# Patient Record
Sex: MALE | Race: WHITE | HISPANIC OR LATINO
[De-identification: names, ages, dates, MRNs, and addresses within clinical notes are randomized per-mention and may not be internally consistent; named-entity substitution may affect disease eponyms.]

---

## 2017-03-21 ENCOUNTER — MEDICATION RENEWAL (OUTPATIENT)
Age: 62
End: 2017-03-21

## 2017-04-24 ENCOUNTER — APPOINTMENT (OUTPATIENT)
Dept: CARDIOLOGY | Facility: CLINIC | Age: 62
End: 2017-04-24

## 2017-04-26 ENCOUNTER — NON-APPOINTMENT (OUTPATIENT)
Age: 62
End: 2017-04-26

## 2017-04-26 ENCOUNTER — APPOINTMENT (OUTPATIENT)
Dept: CARDIOLOGY | Facility: CLINIC | Age: 62
End: 2017-04-26

## 2017-04-26 VITALS
DIASTOLIC BLOOD PRESSURE: 79 MMHG | OXYGEN SATURATION: 96 % | SYSTOLIC BLOOD PRESSURE: 131 MMHG | HEIGHT: 65 IN | HEART RATE: 75 BPM | WEIGHT: 180 LBS | BODY MASS INDEX: 29.99 KG/M2

## 2017-05-16 ENCOUNTER — APPOINTMENT (OUTPATIENT)
Dept: CARDIOLOGY | Facility: CLINIC | Age: 62
End: 2017-05-16

## 2017-05-17 ENCOUNTER — APPOINTMENT (OUTPATIENT)
Dept: CARDIOLOGY | Facility: CLINIC | Age: 62
End: 2017-05-17

## 2017-05-17 ENCOUNTER — APPOINTMENT (OUTPATIENT)
Dept: CARDIOLOGY | Facility: CLINIC | Age: 62
End: 2017-05-17
Payer: MEDICAID

## 2017-05-17 DIAGNOSIS — M79.662 PAIN IN RIGHT LOWER LEG: ICD-10-CM

## 2017-05-17 DIAGNOSIS — M79.661 PAIN IN RIGHT LOWER LEG: ICD-10-CM

## 2017-05-17 PROCEDURE — ZZZZZ: CPT

## 2018-04-18 ENCOUNTER — APPOINTMENT (OUTPATIENT)
Dept: CARDIOLOGY | Facility: CLINIC | Age: 63
End: 2018-04-18
Payer: COMMERCIAL

## 2018-04-18 ENCOUNTER — NON-APPOINTMENT (OUTPATIENT)
Age: 63
End: 2018-04-18

## 2018-04-18 VITALS
DIASTOLIC BLOOD PRESSURE: 80 MMHG | WEIGHT: 180 LBS | SYSTOLIC BLOOD PRESSURE: 138 MMHG | BODY MASS INDEX: 29.95 KG/M2 | HEART RATE: 68 BPM | OXYGEN SATURATION: 97 %

## 2018-04-18 VITALS — WEIGHT: 177 LBS | BODY MASS INDEX: 29.45 KG/M2

## 2018-04-18 DIAGNOSIS — R51 HEADACHE: ICD-10-CM

## 2018-04-18 PROCEDURE — 93000 ELECTROCARDIOGRAM COMPLETE: CPT

## 2018-04-18 PROCEDURE — 99215 OFFICE O/P EST HI 40 MIN: CPT

## 2018-05-08 ENCOUNTER — APPOINTMENT (OUTPATIENT)
Dept: CARDIOLOGY | Facility: CLINIC | Age: 63
End: 2018-05-08
Payer: COMMERCIAL

## 2018-05-08 PROCEDURE — 93306 TTE W/DOPPLER COMPLETE: CPT

## 2018-06-06 ENCOUNTER — APPOINTMENT (OUTPATIENT)
Dept: CARDIOLOGY | Facility: CLINIC | Age: 63
End: 2018-06-06
Payer: COMMERCIAL

## 2018-06-06 PROCEDURE — 93320 DOPPLER ECHO COMPLETE: CPT

## 2018-06-06 PROCEDURE — 93351 STRESS TTE COMPLETE: CPT

## 2019-04-17 ENCOUNTER — NON-APPOINTMENT (OUTPATIENT)
Age: 64
End: 2019-04-17

## 2019-04-17 ENCOUNTER — APPOINTMENT (OUTPATIENT)
Dept: CARDIOLOGY | Facility: CLINIC | Age: 64
End: 2019-04-17
Payer: COMMERCIAL

## 2019-04-17 VITALS — DIASTOLIC BLOOD PRESSURE: 91 MMHG | SYSTOLIC BLOOD PRESSURE: 161 MMHG

## 2019-04-17 VITALS
RESPIRATION RATE: 20 BRPM | HEART RATE: 71 BPM | DIASTOLIC BLOOD PRESSURE: 88 MMHG | BODY MASS INDEX: 31.65 KG/M2 | OXYGEN SATURATION: 96 % | HEIGHT: 65 IN | WEIGHT: 190 LBS | SYSTOLIC BLOOD PRESSURE: 155 MMHG | TEMPERATURE: 98.5 F

## 2019-04-17 DIAGNOSIS — I73.9 PERIPHERAL VASCULAR DISEASE, UNSPECIFIED: ICD-10-CM

## 2019-04-17 PROCEDURE — 99215 OFFICE O/P EST HI 40 MIN: CPT

## 2019-04-17 PROCEDURE — 93000 ELECTROCARDIOGRAM COMPLETE: CPT

## 2019-04-17 NOTE — DISCUSSION/SUMMARY
[Risks] : risks [Patient] : the patient [Benefits] : benefits [Alternatives] : alternatives [With ___] : with [unfilled] [___ Week(s)] : [unfilled] week(s) [FreeTextEntry1] : This is a 60 M with HTN, DM on oral meds, sent for frequent palpitations, found to have frequent supra ventricular tachycardia c/w with paroxysmal afib & dyspnea on exertion.\par 1) Paroxysmal Afib: CHADS =2 ;On xarelto 20 mg daily. symptoms Controlled. if [id number] symptoms then ablation. \par 2)  controlled HTN:ct  norvasc 10 mg. .ct  coreg 25 Q12 (for afib and HTN) and ct losartan 100 mg start chlorthalidone 25 mg daily. \par daily. Diet and exercise . Low salt diet. Potassium was normal .Cr normal. \par 3) CAD prevention/dyslipidemia: intermediate risk: calcium score= 0..  LDL at goal. \par 4) Left leg edema . 2D echo. and US venous Duplex : last visit was normal. if recurrent then repeat echo and US venous Dupelx with reflux study. \par

## 2019-04-17 NOTE — CARDIOLOGY SUMMARY
[Normal] : normal [No Ischemia] : no Ischemia [No Symptoms] : no Symptoms [LVEF ___%] : LVEF [unfilled]% [___] : [unfilled] [None] : no pulmonary hypertension [Mild] : mild mitral regurgitation [Enlarged] : enlarged LA size

## 2019-04-17 NOTE — PHYSICAL EXAM
[Well Groomed] : well groomed [General Appearance - Well Developed] : well developed [Normal Appearance] : normal appearance [General Appearance - Well Nourished] : well nourished [No Deformities] : no deformities [Normal Conjunctiva] : the conjunctiva exhibited no abnormalities [General Appearance - In No Acute Distress] : no acute distress [Eyelids - No Xanthelasma] : the eyelids demonstrated no xanthelasmas [Normal Oral Mucosa] : normal oral mucosa [No Oral Pallor] : no oral pallor [No Oral Cyanosis] : no oral cyanosis [Normal Jugular Venous V Waves Present] : normal jugular venous V waves present [Normal Jugular Venous A Waves Present] : normal jugular venous A waves present [No Jugular Venous Katz A Waves] : no jugular venous katz A waves [Respiration, Rhythm And Depth] : normal respiratory rhythm and effort [Exaggerated Use Of Accessory Muscles For Inspiration] : no accessory muscle use [Auscultation Breath Sounds / Voice Sounds] : lungs were clear to auscultation bilaterally [Heart Sounds] : normal S1 and S2 [Heart Rate And Rhythm] : heart rate and rhythm were normal [Murmurs] : no murmurs present [Abdomen Soft] : soft [Abdomen Tenderness] : non-tender [Abdomen Mass (___ Cm)] : no abdominal mass palpated [Abnormal Walk] : normal gait [Gait - Sufficient For Exercise Testing] : the gait was sufficient for exercise testing [Nail Clubbing] : no clubbing of the fingernails [Cyanosis, Localized] : no localized cyanosis [Petechial Hemorrhages (___cm)] : no petechial hemorrhages [Skin Color & Pigmentation] : normal skin color and pigmentation [] : no rash [No Venous Stasis] : no venous stasis [Skin Lesions] : no skin lesions [No Skin Ulcers] : no skin ulcer [No Xanthoma] : no  xanthoma was observed [Oriented To Time, Place, And Person] : oriented to person, place, and time [Affect] : the affect was normal [Mood] : the mood was normal [No Anxiety] : not feeling anxious [FreeTextEntry1] : 2+ Edema on the left leg

## 2019-04-17 NOTE — REASON FOR VISIT
[Follow-Up - Clinic] : a clinic follow-up of [FreeTextEntry2] : PAF; Dyslipidemia, HTN [FreeTextEntry1] : PAF; Dyslipidemia, HTN, dyspnea on exertion.

## 2019-04-17 NOTE — HISTORY OF PRESENT ILLNESS
[FreeTextEntry1] : F/u for HTN, Paroxysmal afib and dyspnea on exertion\par feels good. no headache. compliant with meds. no chest pain. no dizziness. \par \par Old note: Complains of Mild LE edema. Was concerned about LE edema with nroavasc and concern about the cough with lisinopril. \par No chest pain. Minimal; LE edema. calf edema. Calf pain. calf pain while workin \par \par Old note: feels better. No chest pain. No dyspnea.No LE edema. compliant with meds.\par Wants me to fill out the form . compliant with meds. \par \par OLD note: This is a 60 M with HTN, here for palpitations and abnormal HOLTER.  patient states for the last few months he has symptoms palpitations. He had a HOLTER done by  that was normal. He states its worse when he eats salty food.  2 times / week.  last for few minutes to 10 mins. \par Denies any syncopal episodes. He did have a accident in july 2014, Unclear reason,. he sattes probably he was sleepy.  \par

## 2019-05-21 ENCOUNTER — APPOINTMENT (OUTPATIENT)
Dept: CARDIOLOGY | Facility: CLINIC | Age: 64
End: 2019-05-21
Payer: COMMERCIAL

## 2019-05-21 VITALS
BODY MASS INDEX: 31.32 KG/M2 | OXYGEN SATURATION: 97 % | HEIGHT: 65 IN | HEART RATE: 70 BPM | DIASTOLIC BLOOD PRESSURE: 70 MMHG | SYSTOLIC BLOOD PRESSURE: 128 MMHG | WEIGHT: 188 LBS

## 2019-05-21 VITALS — SYSTOLIC BLOOD PRESSURE: 108 MMHG | DIASTOLIC BLOOD PRESSURE: 64 MMHG

## 2019-05-21 DIAGNOSIS — R60.0 LOCALIZED EDEMA: ICD-10-CM

## 2019-05-21 PROCEDURE — 99214 OFFICE O/P EST MOD 30 MIN: CPT

## 2019-05-21 RX ORDER — METFORMIN HYDROCHLORIDE 625 MG/1
TABLET ORAL
Refills: 0 | Status: ACTIVE | COMMUNITY

## 2019-05-21 RX ORDER — LOSARTAN POTASSIUM 100 MG/1
100 TABLET, FILM COATED ORAL
Qty: 90 | Refills: 2 | Status: DISCONTINUED | COMMUNITY
End: 2019-05-21

## 2020-05-27 ENCOUNTER — APPOINTMENT (OUTPATIENT)
Dept: CARDIOLOGY | Facility: CLINIC | Age: 65
End: 2020-05-27

## 2021-06-20 ENCOUNTER — EMERGENCY (EMERGENCY)
Facility: HOSPITAL | Age: 66
LOS: 1 days | Discharge: HOME | End: 2021-06-20
Attending: EMERGENCY MEDICINE
Payer: COMMERCIAL

## 2021-06-20 VITALS
SYSTOLIC BLOOD PRESSURE: 182 MMHG | RESPIRATION RATE: 18 BRPM | DIASTOLIC BLOOD PRESSURE: 107 MMHG | HEART RATE: 77 BPM | WEIGHT: 179.9 LBS | OXYGEN SATURATION: 96 % | TEMPERATURE: 98 F

## 2021-06-20 VITALS — DIASTOLIC BLOOD PRESSURE: 96 MMHG | SYSTOLIC BLOOD PRESSURE: 172 MMHG

## 2021-06-20 PROCEDURE — 99283 EMERGENCY DEPT VISIT LOW MDM: CPT

## 2021-06-20 RX ORDER — OFLOXACIN 0.3 %
1 DROPS OPHTHALMIC (EYE)
Refills: 0 | Status: DISCONTINUED | OUTPATIENT
Start: 2021-06-20 | End: 2021-06-25

## 2021-06-20 RX ORDER — TETANUS TOXOID, REDUCED DIPHTHERIA TOXOID AND ACELLULAR PERTUSSIS VACCINE, ADSORBED 5; 2.5; 8; 8; 2.5 [IU]/.5ML; [IU]/.5ML; UG/.5ML; UG/.5ML; UG/.5ML
0.5 SUSPENSION INTRAMUSCULAR ONCE
Refills: 0 | Status: DISCONTINUED | OUTPATIENT
Start: 2021-06-20 | End: 2021-06-20

## 2021-06-20 RX ADMIN — Medication 1 DROP(S): at 22:20

## 2021-06-20 NOTE — ED PROVIDER NOTE - ATTENDING CONTRIBUTION TO CARE
65 yo male PMHx HTN (took meds prior to ED), DM presents to ED c/o right eye discomfort sp working with metal on friday . +foreign body sensation  Tetanus UTD. No further complaints at this time. no blurry vision or double vision  Denies f/c/n/v/cp/sob/palpitations/ cough/rash/headache/dizziness/abd.pain/d/c/dysuria/hematuria    Eyes:  Right conjunctival erythema. No discharge. PERRLA, EOMI.   Wood's Lamp:  foreign body between 3 and 4 o'clock position. +Rust ring. Eyelid everted and without foreign body or abrasion. Negative Graham's Test.     --irrigated eye in the ED; abx optho follow up for foreign body removal

## 2021-06-20 NOTE — ED PROVIDER NOTE - CARE PROVIDER_API CALL
Alpesh Mcbride (MD; PhD)  Ophthalmology  [address]  [address]  [place] [address]  Phone: [phone]  Fax: [phone]  Follow Up Time:

## 2021-06-20 NOTE — ED PROVIDER NOTE - PATIENT PORTAL LINK FT
skill demonstration/written material/audio
You can access the FollowMyHealth Patient Portal offered by [facility] by registering at the following website: [email or web address]. By joining [facility]’s FollowMyHealth portal, you will also be able to view your health information using other applications (apps) compatible with our system.

## 2021-06-20 NOTE — ED PROVIDER NOTE - NSFOLLOWUPINSTRUCTIONS_ED_ALL_ED_FT
- Please call tomorrow to schedule follow up appointment with ophthalmologist.   - Please bring all documentation from your ED visit to any related future follow up appointment.  - Please call to schedule follow up appointment with your primary care physician within 24-48 hours.  - Please seek immediate medical attention for any new/worsening, signs/symptoms, or concerns.    Feel better!    - Llame mañana para programar blair jermain de seguimiento con el oftalmólogo.  - Lleve toda la documentación de sommers visita a urgencias a cualquier jermain de seguimiento futura relacionada.  - Llame para programar blair jermain de seguimiento con sommers médico de atención primaria dentro de las 24 a 48 horas.  - Busque atención médica inmediata ante cualquier nuevo / empeoramiento, signo / síntoma o inquietud.    ¡Sentirse mejor!    Marshall ROJAS  [phone]       Cuerpo extraño en el darwin    LO QUE NECESITA SABER:    ¿Qué es un cuerpo extraño en el darwin?Se considera que blair persona tiene un cuerpo extraño en el darwin cuando un objeto se queda atrapado en sommers darwin. Los cuerpos extraños que más frecuentemente quedan atrapados en el darwin son las partículas diminutas de metal, polvo, vela o arena.    Anatomía del darwin         ¿Cuáles son los signos y síntomas de un cuerpo extraño en el darwin?  •La sensación de tener algo en el darwin.      •Dolor en el darwin, enrojecimiento u ojos acuosos.      •Sensibilidad a la camille      •Visión borrosa o cambios en la visión      ¿Cómo se diagnostica un cuerpo extraño en el darwin?Sommers médico le preguntará qué síntomas tiene y le examinará el darwin. El médico puede revisar sommers visión pidiéndole que maricarmen unas letras o unos números en un gráfico. Es posible que usted necesite alguno de los siguientes:  •Blair prueba con lámpara de hendidurautiliza un microscopio para mirar dentro de sommers darwin y revisar si hay bliar lesión. Se podría usar un tinte para buscar rasguños y otros daños en el darwin.             •Un ultrasonido o blair tomografía computarizada (TC)genera imágenes que pueden mostrar donde se encuentra el cuerpo extraño en el darwin. También puede mostrar si tiene daño en las partes más profundas del darwin. Es posible que le administren un líquido de contraste para que el darwin se placido con mayor claridad en las imágenes. Dígale al médico si usted alguna vez ha tenido balir reacción alérgica al líquido de contraste.      ¿Cómo se trata un cuerpo extraño en el darwin?Podrían darle medicamentos para disminuir el dolor y evitar blair infección. Sommers médico podría adormecerle el darwin y lavarlo con líquido para ayudar a quitar el cuerpo extraño. El médico también podría usar un hisopo de algodón u otras herramientas para ayudar a quitar el cuerpo extraño. Si el cuerpo extraño es difícil de quitar o nevarez dañado las partes más profundas del darwin, usted necesitará cirugía para quitarlo.    ¿Qué puedo hacer para ayudar a que mi darwin sane?Puede tener dolor, sensibilidad a la camille o visión borrosa miracle unos días. Tory lo siguiente para ayudar a sanar el darwin:  •No se frote el darwin.[place] puede causar más daño o infección.      •No use thalia lentes de contacto hasta que el adrwin sane.Consulte con sommers médico por cuánto tiempo tiene que seguir esta indicación.      •Use lentes para sol según indicaciones.Los anteojos de sol ayudan a proteger el darwin y disminuir la sensibilidad a la camille.      ¿Qué puedo hacer para evitar otro cuerpo extraño en el darwin?  •Use anteojos de seguridad, protectores oculares o gafas.Estos elementos pueden evitar las lesiones en los ojos. Asegúrese de que los anteojos pa envolventes alrededor de sommers vesna. Use estos elementos mientras trabaja con sustancias químicas, vela o fluidos corporales, allison la milagro. Use anteojos protectores también mientras hace deportes, alilson ráquetbol o natación. No use lentes regulares allison protección. Estos no protegerán thalia ojos de cuerpos extraños o químicos.      •Use lentes de contacto allison se le haya indicado.Lávese las rohini antes de limpiar, ponerse o quitarse thalia lentes de contacto. Cóloquese y quítese los lentes de contacto correctamente. Limpie y cambie los lentes de contactos allison se le haya indicado para prevenir blair infección o daño ocular.  Lavado de rohini           ¿Cuándo rayray buscar atención inmediata?  •Pierde sommers visión repentinamente.      •Tiene dolor intenso en el darwin.      ¿Cuándo rayray llamar a mi médico?  •Se le inflama el darwin o la inflamación empeora.      •Thalia síntomas no mejoran aún después de que le hayan quitado el cuerpo extraño.      •Sommers darwin supura un líquido guido o amarillo.      •Usted tiene preguntas o inquietudes acerca de sommers condición o cuidado.      ACUERDOS SOBRE SOMMERS CUIDADO:    Usted tiene el derecho de ayudar a planear sommers cuidado. Aprenda todo lo que pueda sobre sommers condición y allison darle tratamiento. Discuta thalia opciones de tratamiento con thalia médicos para decidir el cuidado que usted desea recibir. Usted siempre tiene el derecho de rechazar el tratamiento.

## 2021-06-20 NOTE — ED PROVIDER NOTE - OBJECTIVE STATEMENT
ED  Krystyna. 67 yo male presents to ED c/o right eye discomfort. Patient works using metal, had foreign body sensation to eye since Friday. +Tearing. Patient wears reading glasses and shield while at work. Self medicating with OTC medications.   Denies contacts, blurred vision, discharge, headaches. ED  Krystyna. 67 yo male PMHx HTN (took meds prior to ED), DM presents to ED c/o right eye discomfort. Patient works using metal, had foreign body sensation to eye since Friday. +Tearing. Patient wears reading glasses and shield while at work. Self medicating with OTC medications. Tetanus UTD.   Denies contacts, blurred vision, discharge, headaches. ED  Krystyna. 67 yo male PMHx HTN (took meds prior to ED), DM presents to ED c/o right eye discomfort. Patient works using metal, had foreign body sensation to eye since Friday. +Tearing. Patient wears reading glasses and shield while at work. Self medicating with OTC medications. Tetanus UTD. No further complaints at this time.   Denies contacts, blurred vision, discharge, headaches.

## 2021-06-20 NOTE — ED PROVIDER NOTE - PHYSICAL EXAMINATION
General: A&Ox3. In NAD, non-toxic appearing; well nourished/developed.  Eyes: VA: 20/20, OS: 20/20, OD: 20/20. Lids symmetrical without lesions, no ptosis. Right conjunctival erythema. No discharge. PERRLA, EOMI.   Wood's Lamp: Visualization using Wood's Lamp revealed foreign body between 3 and 4 o'clock position. +Rust ring. Eyelid everted and without foreign body or abrasion. Negative Graham's Test.   Cardio: Rate and rhythm regular. No audible murmur, gallop, or rub.   Resp: Normal AP to lateral diameter, symmetrical excursion b/l. Breath sounds vesicular, symmetrical and without rales, rhonchi or wheezing b/l.

## 2021-06-20 NOTE — ED PROVIDER NOTE - CLINICAL SUMMARY MEDICAL DECISION MAKING FREE TEXT BOX
67 yo male PMHx HTN (took meds prior to ED), DM presents to ED with right eye foreign body x3 days after working with metal. Not removed 2/2 length of time and rust ring present. Abx prescribed. Patient strongly encouraged to call ophthalmologist in morning to schedule outpatient appointment.

## 2023-01-15 ENCOUNTER — RX ONLY (RX ONLY)
Age: 68
End: 2023-01-15

## 2023-01-15 ENCOUNTER — OFFICE (OUTPATIENT)
Dept: URBAN - METROPOLITAN AREA CLINIC 94 | Facility: CLINIC | Age: 68
Setting detail: OPHTHALMOLOGY
End: 2023-01-15
Payer: MEDICAID

## 2023-01-15 DIAGNOSIS — H25.12: ICD-10-CM

## 2023-01-15 DIAGNOSIS — H43.393: ICD-10-CM

## 2023-01-15 DIAGNOSIS — H26.491: ICD-10-CM

## 2023-01-15 PROCEDURE — 99213 OFFICE O/P EST LOW 20 MIN: CPT | Performed by: REGISTERED NURSE

## 2023-01-15 ASSESSMENT — KERATOMETRY
METHOD_AUTO_MANUAL: AUTO
OS_K2POWER_DIOPTERS: 43.75
OD_AXISANGLE_DEGREES: 169
OD_K2POWER_DIOPTERS: 43.25
OD_K1POWER_DIOPTERS: 43.00
OS_K1POWER_DIOPTERS: 43.00
OS_AXISANGLE_DEGREES: 122

## 2023-01-15 ASSESSMENT — REFRACTION_MANIFEST
OD_AXIS: 078
OD_SPHERE: +1.50
OD_CYLINDER: -0.75
OD_VA1: 20/30
OS_SPHERE: +0.75
OS_CYLINDER: -0.25
OS_AXIS: 037
OS_VA1: 20/30

## 2023-01-15 ASSESSMENT — REFRACTION_AUTOREFRACTION
OD_AXIS: 072
OS_AXIS: 079
OD_CYLINDER: -0.25
OS_CYLINDER: -0.25
OD_SPHERE: -0.50
OS_SPHERE: +0.75

## 2023-01-15 ASSESSMENT — CORNEAL SURGICAL SCARRING: OD_SCARRING: MID PERIPHERAL

## 2023-01-15 ASSESSMENT — CONFRONTATIONAL VISUAL FIELD TEST (CVF)
OD_FINDINGS: FULL
OS_FINDINGS: FULL

## 2023-01-15 ASSESSMENT — SPHEQUIV_DERIVED
OD_SPHEQUIV: 1.125
OS_SPHEQUIV: 0.625
OS_SPHEQUIV: 0.625
OD_SPHEQUIV: -0.625

## 2023-01-15 ASSESSMENT — TONOMETRY
OS_IOP_MMHG: 16
OD_IOP_MMHG: 14

## 2023-01-15 ASSESSMENT — AXIALLENGTH_DERIVED
OS_AL: 23.396
OS_AL: 23.396
OD_AL: 23.2951
OD_AL: 23.9794

## 2023-01-15 ASSESSMENT — VISUAL ACUITY
OS_BCVA: 20/25+1
OD_BCVA: 20/25+1

## 2023-06-30 PROBLEM — I10 ESSENTIAL (PRIMARY) HYPERTENSION: Chronic | Status: ACTIVE | Noted: 2021-06-21

## 2023-06-30 PROBLEM — E11.9 TYPE 2 DIABETES MELLITUS WITHOUT COMPLICATIONS: Chronic | Status: ACTIVE | Noted: 2021-06-21

## 2023-07-19 ENCOUNTER — OFFICE (OUTPATIENT)
Dept: URBAN - METROPOLITAN AREA CLINIC 94 | Facility: CLINIC | Age: 68
Setting detail: OPHTHALMOLOGY
End: 2023-07-19
Payer: COMMERCIAL

## 2023-07-19 DIAGNOSIS — H43.393: ICD-10-CM

## 2023-07-19 DIAGNOSIS — E11.9: ICD-10-CM

## 2023-07-19 DIAGNOSIS — H25.12: ICD-10-CM

## 2023-07-19 DIAGNOSIS — Z96.1: ICD-10-CM

## 2023-07-19 DIAGNOSIS — H26.491: ICD-10-CM

## 2023-07-19 DIAGNOSIS — H25.042: ICD-10-CM

## 2023-07-19 PROCEDURE — 99214 OFFICE O/P EST MOD 30 MIN: CPT | Performed by: OPHTHALMOLOGY

## 2023-07-19 PROCEDURE — 92250 FUNDUS PHOTOGRAPHY W/I&R: CPT | Performed by: OPHTHALMOLOGY

## 2023-07-19 PROCEDURE — 92136 OPHTHALMIC BIOMETRY: CPT | Performed by: OPHTHALMOLOGY

## 2023-07-19 ASSESSMENT — KERATOMETRY
OS_K2POWER_DIOPTERS: 43.75
OS_CYLPOWER_DEGREES: 0.75
OD_AXISANGLE_DEGREES: 79
OD_K2POWER_DIOPTERS: 43.25
OS_K1POWER_DIOPTERS: 43.00
OS_AXISANGLE2_DEGREES: 122
OS_K1K2_AVERAGE: 43.375
METHOD_AUTO_MANUAL: AUTO
OS_AXISANGLE_DEGREES: 122
OD_CYLAXISANGLE_DEGREES: 169
OD_AXISANGLE_DEGREES: 169
OD_K1POWER_DIOPTERS: 43.00
OD_K2POWER_DIOPTERS: 43.25
OD_CYLPOWER_DEGREES: 0.25
OS_CYLAXISANGLE_DEGREES: 122
OS_AXISANGLE_DEGREES: 32
OD_AXISANGLE2_DEGREES: 169
OD_K1K2_AVERAGE: 43.125
OS_K2POWER_DIOPTERS: 43.75
OD_K1POWER_DIOPTERS: 43.00
OS_K1POWER_DIOPTERS: 43.00

## 2023-07-19 ASSESSMENT — REFRACTION_AUTOREFRACTION
OS_AXIS: 000
OS_CYLINDER: 0.00
OD_SPHERE: -0.75
OD_CYLINDER: -0.50
OS_SPHERE: +0.75
OD_AXIS: 152

## 2023-07-19 ASSESSMENT — REFRACTION_MANIFEST
OS_AXIS: 037
OS_CYLINDER: -0.25
OD_SPHERE: +1.50
OS_VA1: 20/30
OS_SPHERE: +0.75
OD_VA1: 20/30
OD_AXIS: 078
OD_CYLINDER: -0.75

## 2023-07-19 ASSESSMENT — CONFRONTATIONAL VISUAL FIELD TEST (CVF)
OS_FINDINGS: FULL
OD_FINDINGS: FULL

## 2023-07-19 ASSESSMENT — SPHEQUIV_DERIVED
OD_SPHEQUIV: 1.125
OS_SPHEQUIV: 0.75
OD_SPHEQUIV: -1
OS_SPHEQUIV: 0.625

## 2023-07-19 ASSESSMENT — AXIALLENGTH_DERIVED
OD_AL: 24.1313
OD_AL: 23.2951
OS_AL: 23.396
OS_AL: 23.3482

## 2023-07-19 ASSESSMENT — TONOMETRY
OD_IOP_MMHG: 14
OS_IOP_MMHG: 20

## 2023-07-19 ASSESSMENT — VISUAL ACUITY
OD_BCVA: 20/25-1
OS_BCVA: 20/25

## 2023-07-19 ASSESSMENT — CORNEAL SURGICAL SCARRING: OD_SCARRING: MID PERIPHERAL

## 2023-07-28 ENCOUNTER — OFFICE (OUTPATIENT)
Dept: URBAN - METROPOLITAN AREA CLINIC 94 | Facility: CLINIC | Age: 68
Setting detail: OPHTHALMOLOGY
End: 2023-07-28
Payer: COMMERCIAL

## 2023-07-28 DIAGNOSIS — H02.423: ICD-10-CM

## 2023-07-28 DIAGNOSIS — H11.153: ICD-10-CM

## 2023-07-28 DIAGNOSIS — H04.123: ICD-10-CM

## 2023-07-28 PROCEDURE — 92012 INTRM OPH EXAM EST PATIENT: CPT | Performed by: OPHTHALMOLOGY

## 2023-07-28 PROCEDURE — 83861 MICROFLUID ANALY TEARS: CPT | Performed by: OPHTHALMOLOGY

## 2023-07-28 ASSESSMENT — CORNEAL SURGICAL SCARRING: OD_SCARRING: MID PERIPHERAL

## 2023-07-28 ASSESSMENT — REFRACTION_MANIFEST
OS_AXIS: 037
OS_VA1: 20/30
OD_SPHERE: +1.50
OD_VA1: 20/30
OS_CYLINDER: -0.25
OD_CYLINDER: -0.75
OS_SPHERE: +0.75
OD_AXIS: 078

## 2023-07-28 ASSESSMENT — AXIALLENGTH_DERIVED
OD_AL: 23.2951
OS_AL: 23.3482
OS_AL: 23.396
OD_AL: 24.1313

## 2023-07-28 ASSESSMENT — CONFRONTATIONAL VISUAL FIELD TEST (CVF)
OD_FINDINGS: FULL
OS_FINDINGS: FULL

## 2023-07-28 ASSESSMENT — KERATOMETRY
OS_K1POWER_DIOPTERS: 43.00
OD_AXISANGLE_DEGREES: 169
OS_AXISANGLE_DEGREES: 122
METHOD_AUTO_MANUAL: AUTO
OD_K2POWER_DIOPTERS: 43.25
OS_K2POWER_DIOPTERS: 43.75
OD_K1POWER_DIOPTERS: 43.00

## 2023-07-28 ASSESSMENT — VISUAL ACUITY
OS_BCVA: 20/30-1
OD_BCVA: 20/30-1

## 2023-07-28 ASSESSMENT — REFRACTION_AUTOREFRACTION
OS_CYLINDER: 0.00
OS_SPHERE: +0.75
OD_CYLINDER: -0.50
OD_SPHERE: -0.75
OS_AXIS: 000
OD_AXIS: 152

## 2023-07-28 ASSESSMENT — SPHEQUIV_DERIVED
OD_SPHEQUIV: -1
OS_SPHEQUIV: 0.75
OD_SPHEQUIV: 1.125
OS_SPHEQUIV: 0.625

## 2023-07-28 ASSESSMENT — TONOMETRY
OS_IOP_MMHG: 15
OD_IOP_MMHG: 11

## 2023-07-28 ASSESSMENT — LID POSITION - PTOSIS
OS_PTOSIS: LUL 1+
OD_PTOSIS: RUL 1+

## 2023-09-08 ENCOUNTER — OFFICE (OUTPATIENT)
Dept: URBAN - METROPOLITAN AREA CLINIC 94 | Facility: CLINIC | Age: 68
Setting detail: OPHTHALMOLOGY
End: 2023-09-08
Payer: COMMERCIAL

## 2023-09-08 DIAGNOSIS — H04.123: ICD-10-CM

## 2023-09-08 PROCEDURE — 83861 MICROFLUID ANALY TEARS: CPT | Performed by: OPHTHALMOLOGY

## 2023-09-08 PROCEDURE — 92012 INTRM OPH EXAM EST PATIENT: CPT | Performed by: OPHTHALMOLOGY

## 2023-09-08 ASSESSMENT — REFRACTION_MANIFEST
OS_SPHERE: +0.75
OD_CYLINDER: -0.75
OS_VA1: 20/30
OS_CYLINDER: -0.25
OD_AXIS: 078
OD_VA1: 20/30
OD_SPHERE: +1.50
OS_AXIS: 037

## 2023-09-08 ASSESSMENT — KERATOMETRY
OD_K1POWER_DIOPTERS: 43.00
OS_AXISANGLE_DEGREES: 122
OD_K2POWER_DIOPTERS: 43.25
OD_AXISANGLE_DEGREES: 169
METHOD_AUTO_MANUAL: AUTO
OS_K2POWER_DIOPTERS: 43.75
OS_K1POWER_DIOPTERS: 43.00

## 2023-09-08 ASSESSMENT — REFRACTION_AUTOREFRACTION
OD_CYLINDER: -0.50
OS_SPHERE: +0.75
OD_AXIS: 152
OS_AXIS: 000
OS_CYLINDER: 0.00
OD_SPHERE: -0.75

## 2023-09-08 ASSESSMENT — AXIALLENGTH_DERIVED
OD_AL: 23.2951
OD_AL: 24.1313
OS_AL: 23.396
OS_AL: 23.3482

## 2023-09-08 ASSESSMENT — SPHEQUIV_DERIVED
OS_SPHEQUIV: 0.625
OS_SPHEQUIV: 0.75
OD_SPHEQUIV: -1
OD_SPHEQUIV: 1.125

## 2023-09-08 ASSESSMENT — VISUAL ACUITY
OS_BCVA: 20/25
OD_BCVA: 20/30+1

## 2023-09-08 ASSESSMENT — TONOMETRY
OD_IOP_MMHG: 12
OS_IOP_MMHG: 16

## 2023-09-08 ASSESSMENT — LID POSITION - PTOSIS
OS_PTOSIS: LUL 1+
OD_PTOSIS: RUL 1+

## 2023-09-08 ASSESSMENT — CORNEAL SURGICAL SCARRING: OD_SCARRING: MID PERIPHERAL

## 2023-09-08 ASSESSMENT — CONFRONTATIONAL VISUAL FIELD TEST (CVF)
OD_FINDINGS: FULL
OS_FINDINGS: FULL

## 2023-09-15 ENCOUNTER — ASC (OUTPATIENT)
Dept: URBAN - METROPOLITAN AREA SURGERY 8 | Facility: SURGERY | Age: 68
Setting detail: OPHTHALMOLOGY
End: 2023-09-15
Payer: COMMERCIAL

## 2023-09-15 DIAGNOSIS — H25.12: ICD-10-CM

## 2023-09-15 PROCEDURE — 66984 XCAPSL CTRC RMVL W/O ECP: CPT | Performed by: OPHTHALMOLOGY

## 2023-09-16 ENCOUNTER — RX ONLY (RX ONLY)
Age: 68
End: 2023-09-16

## 2023-09-16 ENCOUNTER — OFFICE (OUTPATIENT)
Dept: URBAN - METROPOLITAN AREA CLINIC 94 | Facility: CLINIC | Age: 68
Setting detail: OPHTHALMOLOGY
End: 2023-09-16
Payer: COMMERCIAL

## 2023-09-16 DIAGNOSIS — Z96.1: ICD-10-CM

## 2023-09-16 PROCEDURE — 99024 POSTOP FOLLOW-UP VISIT: CPT | Performed by: OPTOMETRIST

## 2023-09-16 ASSESSMENT — AXIALLENGTH_DERIVED
OS_AL: 23.7855
OD_AL: 24.0327
OD_AL: 23.2504
OS_AL: 23.396

## 2023-09-16 ASSESSMENT — REFRACTION_MANIFEST
OS_AXIS: 037
OD_CYLINDER: -0.75
OS_VA1: 20/30
OD_VA1: 20/30
OD_AXIS: 078
OD_SPHERE: +1.50
OS_CYLINDER: -0.25
OS_SPHERE: +0.75

## 2023-09-16 ASSESSMENT — VISUAL ACUITY
OD_BCVA: 20/20-1
OS_BCVA: 20/40+1

## 2023-09-16 ASSESSMENT — REFRACTION_AUTOREFRACTION
OS_CYLINDER: -1.25
OD_AXIS: 089
OD_SPHERE: -0.50
OD_CYLINDER: -0.75
OS_SPHERE: +0.25
OS_AXIS: 008

## 2023-09-16 ASSESSMENT — SPHEQUIV_DERIVED
OS_SPHEQUIV: -0.375
OS_SPHEQUIV: 0.625
OD_SPHEQUIV: 1.125
OD_SPHEQUIV: -0.875

## 2023-09-16 ASSESSMENT — KERATOMETRY
OS_K2POWER_DIOPTERS: 44.00
OS_K1POWER_DIOPTERS: 42.75
METHOD_AUTO_MANUAL: AUTO
OS_AXISANGLE_DEGREES: 098
OD_AXISANGLE_DEGREES: 090
OD_K1POWER_DIOPTERS: 43.25
OD_K2POWER_DIOPTERS: 43.25

## 2023-09-16 ASSESSMENT — LID POSITION - PTOSIS
OS_PTOSIS: LUL 1+
OD_PTOSIS: RUL 1+

## 2023-09-16 ASSESSMENT — TONOMETRY: OD_IOP_MMHG: 13

## 2023-09-16 ASSESSMENT — CORNEAL EDEMA CLINICAL DESCRIPTION: OS_CORNEALEDEMA: T

## 2023-09-16 ASSESSMENT — CONFRONTATIONAL VISUAL FIELD TEST (CVF)
OD_FINDINGS: FULL
OS_FINDINGS: FULL

## 2023-09-16 ASSESSMENT — CORNEAL SURGICAL SCARRING: OD_SCARRING: MID PERIPHERAL

## 2023-09-29 ENCOUNTER — OFFICE (OUTPATIENT)
Dept: URBAN - METROPOLITAN AREA CLINIC 94 | Facility: CLINIC | Age: 68
Setting detail: OPHTHALMOLOGY
End: 2023-09-29
Payer: COMMERCIAL

## 2023-09-29 DIAGNOSIS — Z96.1: ICD-10-CM

## 2023-09-29 PROCEDURE — 99024 POSTOP FOLLOW-UP VISIT: CPT | Performed by: PHYSICIAN ASSISTANT

## 2023-09-29 ASSESSMENT — AXIALLENGTH_DERIVED
OD_AL: 23.2059
OS_AL: 23.4412
OD_AL: 23.8849
OS_AL: 23.5863

## 2023-09-29 ASSESSMENT — REFRACTION_AUTOREFRACTION
OS_AXIS: 026
OD_SPHERE: -0.25
OD_CYLINDER: -0.75
OS_CYLINDER: -0.50
OS_SPHERE: +0.50
OD_AXIS: 079

## 2023-09-29 ASSESSMENT — LID POSITION - PTOSIS
OD_PTOSIS: RUL 1+
OS_PTOSIS: LUL 1+

## 2023-09-29 ASSESSMENT — KERATOMETRY
OD_K2POWER_DIOPTERS: 43.50
METHOD_AUTO_MANUAL: AUTO
OS_AXISANGLE_DEGREES: 097
OS_K1POWER_DIOPTERS: 42.75
OD_K1POWER_DIOPTERS: 43.25
OS_K2POWER_DIOPTERS: 43.75
OD_AXISANGLE_DEGREES: 168

## 2023-09-29 ASSESSMENT — SPHEQUIV_DERIVED
OS_SPHEQUIV: 0.25
OD_SPHEQUIV: -0.625
OS_SPHEQUIV: 0.625
OD_SPHEQUIV: 1.125

## 2023-09-29 ASSESSMENT — VISUAL ACUITY
OD_BCVA: 20/20
OS_BCVA: 20/30+1

## 2023-09-29 ASSESSMENT — REFRACTION_MANIFEST
OS_CYLINDER: -0.25
OS_AXIS: 037
OS_VA1: 20/30
OD_VA1: 20/30
OS_SPHERE: +0.75
OD_SPHERE: +1.50
OD_CYLINDER: -0.75
OD_AXIS: 078

## 2023-09-29 ASSESSMENT — TONOMETRY
OS_IOP_MMHG: 17
OD_IOP_MMHG: 13

## 2023-09-29 ASSESSMENT — CONFRONTATIONAL VISUAL FIELD TEST (CVF)
OS_FINDINGS: FULL
OD_FINDINGS: FULL

## 2023-09-29 ASSESSMENT — CORNEAL SURGICAL SCARRING: OD_SCARRING: MID PERIPHERAL

## 2023-10-04 ENCOUNTER — APPOINTMENT (OUTPATIENT)
Dept: GASTROENTEROLOGY | Facility: CLINIC | Age: 68
End: 2023-10-04

## 2023-11-09 ENCOUNTER — OFFICE (OUTPATIENT)
Dept: URBAN - METROPOLITAN AREA CLINIC 94 | Facility: CLINIC | Age: 68
Setting detail: OPHTHALMOLOGY
End: 2023-11-09
Payer: COMMERCIAL

## 2023-11-09 DIAGNOSIS — Z96.1: ICD-10-CM

## 2023-11-09 PROBLEM — H52.4 PRESBYOPIA: Status: ACTIVE | Noted: 2023-11-09

## 2023-11-09 PROCEDURE — 99024 POSTOP FOLLOW-UP VISIT: CPT | Performed by: OPTOMETRIST

## 2023-11-09 ASSESSMENT — REFRACTION_MANIFEST
OS_CYLINDER: -0.50
OD_VA1: 20/30
OD_VA1: 20/25
OD_AXIS: 090
OS_VA1: 20/20
OS_VA1: 20/30
OD_AXIS: 078
OS_AXIS: 037
OS_SPHERE: PLANO
OD_ADD: +2.50
OS_ADD: +2.50
OS_AXIS: 050
OD_SPHERE: PLANO
OD_CYLINDER: -0.75
OS_CYLINDER: -0.25
OD_CYLINDER: -0.75
OD_SPHERE: +1.50
OS_SPHERE: +0.75

## 2023-11-09 ASSESSMENT — CONFRONTATIONAL VISUAL FIELD TEST (CVF)
OS_FINDINGS: FULL
OD_FINDINGS: FULL

## 2023-11-09 ASSESSMENT — REFRACTION_AUTOREFRACTION
OS_SPHERE: +0.25
OS_CYLINDER: -0.75
OD_AXIS: 089
OD_SPHERE: 0.00
OD_CYLINDER: -1.00
OS_AXIS: 052

## 2023-11-09 ASSESSMENT — SPHEQUIV_DERIVED
OS_SPHEQUIV: 0.625
OD_SPHEQUIV: 1.125
OD_SPHEQUIV: -0.5
OS_SPHEQUIV: -0.125

## 2023-11-09 ASSESSMENT — CORNEAL SURGICAL SCARRING: OD_SCARRING: MID PERIPHERAL

## 2023-11-09 ASSESSMENT — LID POSITION - PTOSIS
OS_PTOSIS: LUL 1+
OD_PTOSIS: RUL 1+

## 2023-11-20 ENCOUNTER — APPOINTMENT (OUTPATIENT)
Dept: GASTROENTEROLOGY | Facility: CLINIC | Age: 68
End: 2023-11-20
Payer: MEDICARE

## 2023-11-20 VITALS
WEIGHT: 189 LBS | SYSTOLIC BLOOD PRESSURE: 130 MMHG | BODY MASS INDEX: 31.49 KG/M2 | RESPIRATION RATE: 16 BRPM | HEART RATE: 77 BPM | OXYGEN SATURATION: 98 % | DIASTOLIC BLOOD PRESSURE: 70 MMHG | HEIGHT: 65 IN

## 2023-11-20 DIAGNOSIS — Z12.11 ENCOUNTER FOR SCREENING FOR MALIGNANT NEOPLASM OF COLON: ICD-10-CM

## 2023-11-20 PROCEDURE — 99203 OFFICE O/P NEW LOW 30 MIN: CPT

## 2023-11-20 RX ORDER — SODIUM SULFATE, MAGNESIUM SULFATE, AND POTASSIUM CHLORIDE 17.75; 2.7; 2.25 G/1; G/1; G/1
1479-225-188 TABLET ORAL
Qty: 24 | Refills: 0 | Status: ACTIVE | COMMUNITY
Start: 2023-11-20 | End: 1900-01-01

## 2024-01-11 ENCOUNTER — APPOINTMENT (OUTPATIENT)
Dept: CARDIOLOGY | Facility: CLINIC | Age: 69
End: 2024-01-11
Payer: MEDICARE

## 2024-01-11 VITALS
HEIGHT: 65 IN | OXYGEN SATURATION: 97 % | WEIGHT: 185 LBS | BODY MASS INDEX: 30.82 KG/M2 | SYSTOLIC BLOOD PRESSURE: 146 MMHG | HEART RATE: 82 BPM | DIASTOLIC BLOOD PRESSURE: 86 MMHG

## 2024-01-11 VITALS — SYSTOLIC BLOOD PRESSURE: 154 MMHG | DIASTOLIC BLOOD PRESSURE: 80 MMHG

## 2024-01-11 DIAGNOSIS — E11.9 TYPE 2 DIABETES MELLITUS W/OUT COMPLICATIONS: ICD-10-CM

## 2024-01-11 DIAGNOSIS — Z82.49 FAMILY HISTORY OF ISCHEMIC HEART DISEASE AND OTHER DISEASES OF THE CIRCULATORY SYSTEM: ICD-10-CM

## 2024-01-11 DIAGNOSIS — Z00.00 ENCOUNTER FOR GENERAL ADULT MEDICAL EXAMINATION W/OUT ABNORMAL FINDINGS: ICD-10-CM

## 2024-01-11 DIAGNOSIS — I10 ESSENTIAL (PRIMARY) HYPERTENSION: ICD-10-CM

## 2024-01-11 DIAGNOSIS — I48.0 PAROXYSMAL ATRIAL FIBRILLATION: ICD-10-CM

## 2024-01-11 DIAGNOSIS — R06.83 SNORING: ICD-10-CM

## 2024-01-11 DIAGNOSIS — R00.2 PALPITATIONS: ICD-10-CM

## 2024-01-11 DIAGNOSIS — R06.09 OTHER FORMS OF DYSPNEA: ICD-10-CM

## 2024-01-11 DIAGNOSIS — E78.5 HYPERLIPIDEMIA, UNSPECIFIED: ICD-10-CM

## 2024-01-11 PROCEDURE — 99205 OFFICE O/P NEW HI 60 MIN: CPT

## 2024-01-11 PROCEDURE — 93000 ELECTROCARDIOGRAM COMPLETE: CPT

## 2024-01-11 RX ORDER — CHLORTHALIDONE 25 MG/1
25 TABLET ORAL DAILY
Qty: 90 | Refills: 3 | Status: DISCONTINUED | COMMUNITY
Start: 2019-04-17 | End: 2024-01-11

## 2024-01-11 RX ORDER — LISINOPRIL AND HYDROCHLOROTHIAZIDE TABLETS 20; 25 MG/1; MG/1
20-25 TABLET ORAL DAILY
Qty: 120 | Refills: 3 | Status: ACTIVE | COMMUNITY
Start: 2019-05-21 | End: 1900-01-01

## 2024-01-11 NOTE — CARDIOLOGY SUMMARY
[Normal] : normal [No Ischemia] : no Ischemia [No Symptoms] : no Symptoms [LVEF ___%] : LVEF [unfilled]% [___] : [unfilled] [None] : no pulmonary hypertension [Enlarged] : enlarged LA size [Mild] : mild mitral regurgitation [de-identified] : 1 11 2024 sinus normal ECG

## 2024-01-11 NOTE — REASON FOR VISIT
[Follow-Up - Clinic] : a clinic follow-up of [FreeTextEntry1] : PAF; Dyslipidemia, HTN, dyspnea on exertion. [FreeTextEntry2] : PAF; Dyslipidemia, HTN

## 2024-01-11 NOTE — PHYSICAL EXAM
[General Appearance - Well Developed] : well developed [Normal Appearance] : normal appearance [Well Groomed] : well groomed [General Appearance - Well Nourished] : well nourished [No Deformities] : no deformities [General Appearance - In No Acute Distress] : no acute distress [Normal Conjunctiva] : the conjunctiva exhibited no abnormalities [Eyelids - No Xanthelasma] : the eyelids demonstrated no xanthelasmas [Normal Oral Mucosa] : normal oral mucosa [No Oral Pallor] : no oral pallor [No Oral Cyanosis] : no oral cyanosis [Normal Jugular Venous A Waves Present] : normal jugular venous A waves present [Normal Jugular Venous V Waves Present] : normal jugular venous V waves present [No Jugular Venous Katz A Waves] : no jugular venous katz A waves [Respiration, Rhythm And Depth] : normal respiratory rhythm and effort [Exaggerated Use Of Accessory Muscles For Inspiration] : no accessory muscle use [Auscultation Breath Sounds / Voice Sounds] : lungs were clear to auscultation bilaterally [Heart Rate And Rhythm] : heart rate and rhythm were normal [Heart Sounds] : normal S1 and S2 [Murmurs] : no murmurs present [Abdomen Soft] : soft [Abdomen Tenderness] : non-tender [Abdomen Mass (___ Cm)] : no abdominal mass palpated [Abnormal Walk] : normal gait [Gait - Sufficient For Exercise Testing] : the gait was sufficient for exercise testing [Nail Clubbing] : no clubbing of the fingernails [Cyanosis, Localized] : no localized cyanosis [Petechial Hemorrhages (___cm)] : no petechial hemorrhages [Skin Color & Pigmentation] : normal skin color and pigmentation [] : no rash [No Venous Stasis] : no venous stasis [Skin Lesions] : no skin lesions [No Skin Ulcers] : no skin ulcer [No Xanthoma] : no  xanthoma was observed [Oriented To Time, Place, And Person] : oriented to person, place, and time [Affect] : the affect was normal [Mood] : the mood was normal [No Anxiety] : not feeling anxious [FreeTextEntry1] : 2+ Edema on the left leg

## 2024-01-11 NOTE — HISTORY OF PRESENT ILLNESS
[FreeTextEntry1] : F/u for HTN, Paroxysmal afib and dyspnea on exertion  HPI for today: : This is a 60 M with HTN here for followup.  he has not seen me for 3 years now . he states because of covid.  denies any chest pain . no dyspnea on exertion ./  no headcehs. no syncope.   no plapoitaitons complaitn with med   Pre-operative cardiovascular risk evaluation and management for colonoscopy.    old noteL: feels good. no headache. compliant with meds. no chest pain. no dizziness.   Old note: Complains of Mild LE edema. Was concerned about LE edema with nroavasc and concern about the cough with lisinopril.  No chest pain. Minimal; LE edema. calf edema. Calf pain. calf pain while workin   Old note: feels better. No chest pain. No dyspnea.No LE edema. compliant with meds. Wants me to fill out the form . compliant with meds.   OLD note: This is a 60 M with HTN, here for palpitations and abnormal HOLTER.  patient states for the last few months he has symptoms palpitations. He had a HOLTER done by  that was normal. He states its worse when he eats salty food.  2 times / week.  last for few minutes to 10 mins.  Denies any syncopal episodes. He did have a accident in july 2014, Unclear reason,. he sattes probably he was sleepy.

## 2024-01-11 NOTE — DISCUSSION/SUMMARY
[Patient] : the patient [Risks] : risks [Benefits] : benefits [Alternatives] : alternatives [With Me] : with me [___ Month(s)] : in [unfilled] month(s) [EKG obtained to assist in diagnosis and management of assessed problem(s)] : EKG obtained to assist in diagnosis and management of assessed problem(s) [FreeTextEntry1] : This is a 68 M with HTN, DM on oral meds, sent for frequent palpitations, found to have frequent supra ventricular tachycardia c/w with paroxysmal afib  here for Pre-operative cardiovascular risk evaluation and management  1)  Pre-operative cardiovascular risk evaluation and management : coplonoscopy lowrisk procedure.  no further cardiac work up is needed. Proceed forprocedure as indicated./   hold xarelto 3 ays prior to proceure . restart xarelto after procedure  2) Paroxysmal Afib: CHADS =2 ;On xarelto 20 mg daily. symptoms Controlled. if [id number] symptoms then ablation.  3   HTN: .ct  coreg 25 Q12 (for afib and HTN) and  daily. Diet and exercise . Low salt diet.    not taking norvasc any more. increase lisijnopril to 40 HCTZ 50 mg  4) CAD prevention/dyslipidemia: intermediate risk: calcium score= 0..  5)  BMP check in 2 weeks.    will get blood work results form his PCP   RTC screening cardiac  test for 3 mths.

## 2024-01-18 ENCOUNTER — TRANSCRIPTION ENCOUNTER (OUTPATIENT)
Age: 69
End: 2024-01-18

## 2024-01-19 ENCOUNTER — OUTPATIENT (OUTPATIENT)
Dept: OUTPATIENT SERVICES | Facility: HOSPITAL | Age: 69
LOS: 1 days | End: 2024-01-19
Payer: MEDICARE

## 2024-01-19 ENCOUNTER — APPOINTMENT (OUTPATIENT)
Dept: GASTROENTEROLOGY | Facility: GI CENTER | Age: 69
End: 2024-01-19
Payer: MEDICARE

## 2024-01-19 ENCOUNTER — RESULT REVIEW (OUTPATIENT)
Age: 69
End: 2024-01-19

## 2024-01-19 DIAGNOSIS — Z12.11 ENCOUNTER FOR SCREENING FOR MALIGNANT NEOPLASM OF COLON: ICD-10-CM

## 2024-01-19 LAB — GLUCOSE BLDC GLUCOMTR-MCNC: 167 MG/DL — HIGH (ref 70–99)

## 2024-01-19 PROCEDURE — 82962 GLUCOSE BLOOD TEST: CPT

## 2024-01-19 PROCEDURE — 45385 COLONOSCOPY W/LESION REMOVAL: CPT | Mod: RT

## 2024-01-19 PROCEDURE — 88305 TISSUE EXAM BY PATHOLOGIST: CPT | Mod: 26

## 2024-01-19 PROCEDURE — 88305 TISSUE EXAM BY PATHOLOGIST: CPT

## 2024-01-19 PROCEDURE — 45380 COLONOSCOPY AND BIOPSY: CPT | Mod: PT

## 2024-01-19 NOTE — ASSESSMENT
[FreeTextEntry1] : 68-year-old gentleman history of claudication, diabetes, dyslipidemia, hypertension, A-fib on Xarelto, here to establish care.  The patient is medically optimized for procedure(s). All questions have been answered. The risks and benefits of the procedure have been discussed and the patient signed consent for the procedure. Preliminary results will be discussed when the patient wakes up from anesthesia, but definitive results will be discussed after the pathology report is issued in 5 business days.

## 2024-01-19 NOTE — HISTORY OF PRESENT ILLNESS
[FreeTextEntry1] : 68-year-old gentleman history of claudication, diabetes, dyslipidemia, hypertension, A-fib on Xarelto, here to establish care.

## 2024-01-30 LAB — SURGICAL PATHOLOGY STUDY: SIGNIFICANT CHANGE UP

## 2024-02-27 ENCOUNTER — NON-APPOINTMENT (OUTPATIENT)
Age: 69
End: 2024-02-27

## 2024-04-24 ENCOUNTER — APPOINTMENT (OUTPATIENT)
Dept: CARDIOLOGY | Facility: CLINIC | Age: 69
End: 2024-04-24

## 2024-06-06 ENCOUNTER — APPOINTMENT (OUTPATIENT)
Dept: CARDIOLOGY | Facility: CLINIC | Age: 69
End: 2024-06-06

## 2024-11-07 ENCOUNTER — OFFICE (OUTPATIENT)
Dept: URBAN - METROPOLITAN AREA CLINIC 94 | Facility: CLINIC | Age: 69
Setting detail: OPHTHALMOLOGY
End: 2024-11-07
Payer: COMMERCIAL

## 2024-11-07 DIAGNOSIS — Z01.00: ICD-10-CM

## 2024-11-07 PROCEDURE — 92014 COMPRE OPH EXAM EST PT 1/>: CPT | Performed by: OPTOMETRIST

## 2024-11-07 ASSESSMENT — REFRACTION_MANIFEST
OD_ADD: +2.50
OD_VA1: 20/25
OD_CYLINDER: -0.75
OS_SPHERE: +0.75
OD_VA1: 20/25
OS_CYLINDER: -0.50
OD_CYLINDER: -0.75
OD_SPHERE: +1.50
OS_VA1: 20/25
OD_ADD: +2.50
OD_VA1: 20/30
OS_ADD: +2.50
OD_SPHERE: PLANO
OS_VA1: 20/20
OD_AXIS: 080
OS_ADD: +2.50
OD_CYLINDER: -0.50
OS_AXIS: 037
OS_SPHERE: -0.25
OD_SPHERE: -0.25
OD_AXIS: 090
OS_CYLINDER: -0.50
OD_AXIS: 078
OS_VA1: 20/30
OS_SPHERE: PLANO
OS_AXIS: 050
OS_AXIS: 020
OS_CYLINDER: -0.25

## 2024-11-07 ASSESSMENT — REFRACTION_AUTOREFRACTION
OS_SPHERE: +0.50
OS_CYLINDER: -1.25
OD_AXIS: 070
OD_SPHERE: -0.50
OS_AXIS: 025
OD_CYLINDER: -0.50

## 2024-11-07 ASSESSMENT — KERATOMETRY
OD_K1POWER_DIOPTERS: 43.75
OS_K2POWER_DIOPTERS: 43.75
METHOD_AUTO_MANUAL: AUTO
OD_K2POWER_DIOPTERS: 44.00
OS_K1POWER_DIOPTERS: 42.75
OS_AXISANGLE_DEGREES: 105
OD_AXISANGLE_DEGREES: 152

## 2024-11-07 ASSESSMENT — CONFRONTATIONAL VISUAL FIELD TEST (CVF)
OS_FINDINGS: FULL
OD_FINDINGS: FULL

## 2024-11-07 ASSESSMENT — TONOMETRY
OS_IOP_MMHG: 17
OD_IOP_MMHG: 15

## 2024-11-07 ASSESSMENT — LID POSITION - PTOSIS
OS_PTOSIS: LUL 1+
OD_PTOSIS: RUL 1+

## 2024-11-07 ASSESSMENT — VISUAL ACUITY
OD_BCVA: 20/30
OS_BCVA: 20/30

## 2024-11-07 ASSESSMENT — CORNEAL SURGICAL SCARRING: OD_SCARRING: MID PERIPHERAL

## 2024-12-18 ENCOUNTER — OFFICE (OUTPATIENT)
Dept: URBAN - METROPOLITAN AREA CLINIC 94 | Facility: CLINIC | Age: 69
Setting detail: OPHTHALMOLOGY
End: 2024-12-18
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
## 2024-12-18 PROCEDURE — NO SHOW FE NO SHOW FEE: Performed by: OPHTHALMOLOGY

## 2025-02-01 ENCOUNTER — RX ONLY (RX ONLY)
Age: 70
End: 2025-02-01

## 2025-02-01 ENCOUNTER — OFFICE (OUTPATIENT)
Dept: URBAN - METROPOLITAN AREA CLINIC 112 | Facility: CLINIC | Age: 70
Setting detail: OPHTHALMOLOGY
End: 2025-02-01
Payer: COMMERCIAL

## 2025-02-01 DIAGNOSIS — H16.223: ICD-10-CM

## 2025-02-01 DIAGNOSIS — H43.393: ICD-10-CM

## 2025-02-01 DIAGNOSIS — Z96.1: ICD-10-CM

## 2025-02-01 PROBLEM — H26.493 POSTERIOR CAPSULE OPACITY; BOTH EYES: Status: ACTIVE | Noted: 2025-02-01

## 2025-02-01 PROCEDURE — 92250 FUNDUS PHOTOGRAPHY W/I&R: CPT | Performed by: OPHTHALMOLOGY

## 2025-02-01 PROCEDURE — 92014 COMPRE OPH EXAM EST PT 1/>: CPT | Performed by: OPHTHALMOLOGY

## 2025-02-01 ASSESSMENT — REFRACTION_MANIFEST
OD_SPHERE: -0.25
OS_ADD: +2.50
OS_CYLINDER: -0.25
OD_CYLINDER: -0.75
OS_VA1: 20/25
OS_SPHERE: +0.75
OS_ADD: +2.50
OD_SPHERE: +1.50
OS_CYLINDER: -0.50
OS_CYLINDER: -0.50
OS_AXIS: 037
OS_SPHERE: -0.25
OD_SPHERE: PLANO
OD_VA1: 20/25
OD_AXIS: 078
OS_VA1: 20/30
OD_VA1: 20/30
OD_ADD: +2.50
OD_AXIS: 090
OS_AXIS: 050
OS_AXIS: 020
OD_CYLINDER: -0.50
OS_VA1: 20/20
OD_AXIS: 080
OS_SPHERE: PLANO
OD_VA1: 20/25
OD_CYLINDER: -0.75
OD_ADD: +2.50

## 2025-02-01 ASSESSMENT — TONOMETRY
OS_IOP_MMHG: 16
OD_IOP_MMHG: 13

## 2025-02-01 ASSESSMENT — KERATOMETRY
OD_K2POWER_DIOPTERS: 43.50
OD_AXISANGLE_DEGREES: 135
OS_K2POWER_DIOPTERS: 43.50
METHOD_AUTO_MANUAL: AUTO
OS_AXISANGLE_DEGREES: 118
OD_K1POWER_DIOPTERS: 43.25
OS_K1POWER_DIOPTERS: 42.75

## 2025-02-01 ASSESSMENT — LID POSITION - PTOSIS
OS_PTOSIS: LUL 1+
OD_PTOSIS: RUL 1+

## 2025-02-01 ASSESSMENT — REFRACTION_AUTOREFRACTION
OS_SPHERE: +0.50
OD_SPHERE: -0.25
OD_CYLINDER: -0.75
OS_CYLINDER: -0.75
OD_AXIS: 078
OS_AXIS: 045

## 2025-02-01 ASSESSMENT — CONFRONTATIONAL VISUAL FIELD TEST (CVF)
OS_FINDINGS: FULL
OD_FINDINGS: FULL

## 2025-02-01 ASSESSMENT — VISUAL ACUITY
OS_BCVA: 20/40
OD_BCVA: 20/20

## 2025-02-01 ASSESSMENT — CORNEAL SURGICAL SCARRING: OD_SCARRING: MID PERIPHERAL

## 2025-02-01 ASSESSMENT — SUPERFICIAL PUNCTATE KERATITIS (SPK)
OD_SPK: 1+
OS_SPK: 1+

## 2025-02-15 ENCOUNTER — EMERGENCY (EMERGENCY)
Facility: HOSPITAL | Age: 70
LOS: 1 days | Discharge: HOME | End: 2025-02-15
Attending: STUDENT IN AN ORGANIZED HEALTH CARE EDUCATION/TRAINING PROGRAM
Payer: COMMERCIAL

## 2025-02-15 VITALS
HEART RATE: 77 BPM | DIASTOLIC BLOOD PRESSURE: 76 MMHG | TEMPERATURE: 97 F | RESPIRATION RATE: 18 BRPM | OXYGEN SATURATION: 96 % | SYSTOLIC BLOOD PRESSURE: 135 MMHG

## 2025-02-15 VITALS
WEIGHT: 175.05 LBS | TEMPERATURE: 98 F | OXYGEN SATURATION: 98 % | HEIGHT: 62 IN | SYSTOLIC BLOOD PRESSURE: 164 MMHG | RESPIRATION RATE: 18 BRPM | HEART RATE: 86 BPM | DIASTOLIC BLOOD PRESSURE: 83 MMHG

## 2025-02-15 PROCEDURE — 73590 X-RAY EXAM OF LOWER LEG: CPT | Mod: 26,LT

## 2025-02-15 PROCEDURE — T1013: CPT

## 2025-02-15 PROCEDURE — 73630 X-RAY EXAM OF FOOT: CPT

## 2025-02-15 PROCEDURE — 99284 EMERGENCY DEPT VISIT MOD MDM: CPT | Mod: 25

## 2025-02-15 PROCEDURE — 27780 TREATMENT OF FIBULA FRACTURE: CPT | Mod: 54,LT

## 2025-02-15 PROCEDURE — 73630 X-RAY EXAM OF FOOT: CPT | Mod: 26,LT

## 2025-02-15 PROCEDURE — 73610 X-RAY EXAM OF ANKLE: CPT

## 2025-02-15 PROCEDURE — 73590 X-RAY EXAM OF LOWER LEG: CPT

## 2025-02-15 PROCEDURE — 99284 EMERGENCY DEPT VISIT MOD MDM: CPT | Mod: 57

## 2025-02-15 PROCEDURE — 27780 TREATMENT OF FIBULA FRACTURE: CPT | Mod: LT

## 2025-02-15 PROCEDURE — 73610 X-RAY EXAM OF ANKLE: CPT | Mod: 26,LT

## 2025-02-15 RX ORDER — OXYCODONE HYDROCHLORIDE 30 MG/1
5 TABLET ORAL ONCE
Refills: 0 | Status: DISCONTINUED | OUTPATIENT
Start: 2025-02-15 | End: 2025-02-15

## 2025-02-15 RX ADMIN — OXYCODONE HYDROCHLORIDE 5 MILLIGRAM(S): 30 TABLET ORAL at 14:57

## 2025-02-26 ENCOUNTER — APPOINTMENT (OUTPATIENT)
Dept: ORTHOPEDIC SURGERY | Facility: CLINIC | Age: 70
End: 2025-02-26
Payer: MEDICARE

## 2025-02-26 VITALS
HEART RATE: 92 BPM | HEIGHT: 65 IN | SYSTOLIC BLOOD PRESSURE: 144 MMHG | WEIGHT: 170 LBS | BODY MASS INDEX: 28.32 KG/M2 | DIASTOLIC BLOOD PRESSURE: 74 MMHG

## 2025-02-26 DIAGNOSIS — S82.812A: ICD-10-CM

## 2025-02-26 DIAGNOSIS — S82.832A UNSPECIFIED FRACTURE OF UPPER END OF LEFT TIBIA, INITIAL ENCOUNTER FOR CLOSED FRACTURE: ICD-10-CM

## 2025-02-26 DIAGNOSIS — S82.102A UNSPECIFIED FRACTURE OF UPPER END OF LEFT TIBIA, INITIAL ENCOUNTER FOR CLOSED FRACTURE: ICD-10-CM

## 2025-02-26 PROCEDURE — 27780 TREATMENT OF FIBULA FRACTURE: CPT | Mod: LT

## 2025-02-26 PROCEDURE — 99203 OFFICE O/P NEW LOW 30 MIN: CPT

## 2025-02-26 RX ORDER — GLIMEPIRIDE 4 MG/1
4 TABLET ORAL
Refills: 0 | Status: ACTIVE | COMMUNITY

## 2025-02-26 RX ORDER — LOSARTAN POTASSIUM 100 MG/1
TABLET, FILM COATED ORAL
Refills: 0 | Status: ACTIVE | COMMUNITY

## 2025-02-26 RX ORDER — AMLODIPINE BESYLATE 5 MG/1
5 TABLET ORAL
Refills: 0 | Status: ACTIVE | COMMUNITY

## 2025-02-26 RX ORDER — APIXABAN 5 MG/1
5 TABLET, FILM COATED ORAL
Refills: 0 | Status: ACTIVE | COMMUNITY

## 2025-03-24 ENCOUNTER — APPOINTMENT (OUTPATIENT)
Dept: ORTHOPEDIC SURGERY | Facility: CLINIC | Age: 70
End: 2025-03-24
Payer: MEDICARE

## 2025-03-24 VITALS
DIASTOLIC BLOOD PRESSURE: 87 MMHG | WEIGHT: 170 LBS | HEART RATE: 75 BPM | BODY MASS INDEX: 28.32 KG/M2 | HEIGHT: 65 IN | SYSTOLIC BLOOD PRESSURE: 159 MMHG

## 2025-03-24 DIAGNOSIS — S82.812D: ICD-10-CM

## 2025-03-24 PROCEDURE — 99213 OFFICE O/P EST LOW 20 MIN: CPT

## 2025-03-24 PROCEDURE — 73590 X-RAY EXAM OF LOWER LEG: CPT | Mod: 26,LT

## 2025-06-14 ENCOUNTER — APPOINTMENT (OUTPATIENT)
Dept: ORTHOPEDIC SURGERY | Facility: CLINIC | Age: 70
End: 2025-06-14

## 2025-06-14 VITALS — HEIGHT: 65 IN | WEIGHT: 170 LBS | BODY MASS INDEX: 28.32 KG/M2

## 2025-06-14 PROCEDURE — 99214 OFFICE O/P EST MOD 30 MIN: CPT | Mod: 25

## 2025-06-14 PROCEDURE — 73564 X-RAY EXAM KNEE 4 OR MORE: CPT | Mod: 50

## 2025-06-14 PROCEDURE — 20610 DRAIN/INJ JOINT/BURSA W/O US: CPT | Mod: 50

## 2025-06-17 PROBLEM — M25.561 BILATERAL CHRONIC KNEE PAIN: Status: ACTIVE | Noted: 2025-06-14

## 2025-08-21 ENCOUNTER — APPOINTMENT (OUTPATIENT)
Dept: ORTHOPEDIC SURGERY | Facility: CLINIC | Age: 70
End: 2025-08-21
Payer: MEDICARE

## 2025-08-21 VITALS
HEIGHT: 65 IN | BODY MASS INDEX: 28.32 KG/M2 | SYSTOLIC BLOOD PRESSURE: 149 MMHG | DIASTOLIC BLOOD PRESSURE: 85 MMHG | HEART RATE: 73 BPM | WEIGHT: 170 LBS

## 2025-08-21 DIAGNOSIS — M17.11 UNILATERAL PRIMARY OSTEOARTHRITIS, RIGHT KNEE: ICD-10-CM

## 2025-08-21 DIAGNOSIS — M17.12 UNILATERAL PRIMARY OSTEOARTHRITIS, LEFT KNEE: ICD-10-CM

## 2025-08-21 PROCEDURE — 99214 OFFICE O/P EST MOD 30 MIN: CPT

## 2025-08-21 PROCEDURE — G2211 COMPLEX E/M VISIT ADD ON: CPT

## 2025-08-28 RX ORDER — HYALURONATE SODIUM 20 MG/2 ML
20 SYRINGE (ML) INTRAARTICULAR
Qty: 2 | Refills: 0 | Status: ACTIVE | OUTPATIENT
Start: 2025-08-21

## (undated) DEVICE — KIT DEFENDO 4 OLY 4 PC

## (undated) DEVICE — POLY TRAP ETRAP